# Patient Record
Sex: FEMALE | Race: WHITE | Employment: FULL TIME
[De-identification: names, ages, dates, MRNs, and addresses within clinical notes are randomized per-mention and may not be internally consistent; named-entity substitution may affect disease eponyms.]

---

## 2018-06-02 ENCOUNTER — HEALTH MAINTENANCE LETTER (OUTPATIENT)
Age: 48
End: 2018-06-02

## 2018-11-05 ENCOUNTER — TELEPHONE (OUTPATIENT)
Dept: FAMILY MEDICINE | Facility: CLINIC | Age: 48
End: 2018-11-05

## 2019-02-11 ENCOUNTER — TRANSFERRED RECORDS (OUTPATIENT)
Dept: HEALTH INFORMATION MANAGEMENT | Facility: CLINIC | Age: 49
End: 2019-02-11

## 2019-02-12 ENCOUNTER — DOCUMENTATION ONLY (OUTPATIENT)
Dept: CARE COORDINATION | Facility: CLINIC | Age: 49
End: 2019-02-12

## 2019-02-17 DIAGNOSIS — C78.7 METASTATIC RENAL CELL CARCINOMA TO LIVER (H): Primary | ICD-10-CM

## 2019-02-17 DIAGNOSIS — C64.9 METASTATIC RENAL CELL CARCINOMA TO LIVER (H): Primary | ICD-10-CM

## 2019-02-19 ENCOUNTER — TELEPHONE (OUTPATIENT)
Dept: VASCULAR SURGERY | Facility: CLINIC | Age: 49
End: 2019-02-19

## 2019-02-19 DIAGNOSIS — C64.9 METASTATIC RENAL CELL CARCINOMA TO LIVER (H): Primary | ICD-10-CM

## 2019-02-19 DIAGNOSIS — C78.7 METASTATIC RENAL CELL CARCINOMA TO LIVER (H): Primary | ICD-10-CM

## 2019-02-19 NOTE — TELEPHONE ENCOUNTER
"Previsit call to patient regarding new pt consult.     Inquired on a new appt time. Apologized that there was a cross in communication.     We will go ahead and r/s her to Mon 2/25 with Dr Winter at 120pm.     She agrees to this. appt details given but will also send a letter and map.     She states that her chemo schedule is that she has 2 weeks on and then 1 week off.     She will start her 2 week \"on\" chemo on 3/4 and 3/11.     Informed her that I will pass this information along.    Pt has my direct line should she have any other questions.     Winnie Limon RN, BSN  Interventional Radiology Nurse Coordinator   Phone: [phone]    "

## 2019-02-25 ENCOUNTER — OFFICE VISIT (OUTPATIENT)
Dept: VASCULAR SURGERY | Facility: CLINIC | Age: 49
End: 2019-02-25
Payer: COMMERCIAL

## 2019-02-25 VITALS — OXYGEN SATURATION: 96 % | HEART RATE: 85 BPM | DIASTOLIC BLOOD PRESSURE: 97 MMHG | SYSTOLIC BLOOD PRESSURE: 155 MMHG

## 2019-02-25 DIAGNOSIS — C64.1 RENAL CELL CARCINOMA, RIGHT (H): Primary | ICD-10-CM

## 2019-02-25 DIAGNOSIS — C64.9 METASTATIC RENAL CELL CARCINOMA TO LIVER (H): Primary | ICD-10-CM

## 2019-02-25 DIAGNOSIS — C78.7 METASTATIC RENAL CELL CARCINOMA TO LIVER (H): Primary | ICD-10-CM

## 2019-02-25 RX ORDER — SUNITINIB MALATE 37.5 MG/1
CAPSULE ORAL
COMMUNITY
Start: 2018-11-30

## 2019-02-25 RX ORDER — RIVAROXABAN 20 MG/1
TABLET, FILM COATED ORAL
COMMUNITY
Start: 2019-02-21

## 2019-02-25 ASSESSMENT — PAIN SCALES - GENERAL: PAINLEVEL: MODERATE PAIN (5)

## 2019-02-25 NOTE — PATIENT INSTRUCTIONS
Preventive Care:    Colorectal Cancer Screening: During our visit today, we discussed that it is recommended you receive colorectal cancer screening. Please call or make an appointment with your primary care provider to discuss this. You may also call the  [employer] scheduling line ([phone]) to set up a colonoscopy appointment.      We have made the Ultrasound appointment for Thursday 2/28/2019  Check in: 645am for a 7am appointment  -Please do not have anything to eat or drink 8 hours prior to this procedure.     Should you need to reschedule, please call the scheduling line at [phone] to reschedule prior to your treatment date as indicated below.           APPOINTMENT DETAILS    DATE: Wednesday 3/6/2019  CHECK IN: 1030AM      Please check into the Pre-op Area, 3rd floor , at [facility] located at [address] [place] [address].    Reminders:    -Nothing to eat or drink after midnight.    -Please have a  as you will be going home afterwards.    -Please do take your morning medications as indicated with just enough water to swallow, with the exception of the medications listed below:     -PLEASE DO NOT TAKE YOUR ASPIRIN 5 DAYS PRIOR TO THIS PROCEDURE AS DISCUSSED.      -WHAT TO DO INCASE OF THE FOLLOWING SYMPTOMS:       Please call me during business hours (M-F 8-4PM)  should you develop the following symptoms:     1. Fever over 102, that does not come down with over the counter medications (EX. Tylenol, ibuprofen)  2. Swelling in the lower legs.  3. Vomiting, Nausea  4. Uncontrollable pain, with the use of pain medications  5. Back pain that radiates to the lower back and pelvis region and doesn't go away with pain meds.   6. Or may have any other questions that I can assist you with    However, if it is after-hours or on the weekend,  please call the hospital's main number: [phone] and ask for the Interventional Radiologist On-call.     *After the procedure I  will call and follow up with you 2-3 days afterwards and our  will call you with a 1 month follow up appointment with imaging and lab work.    Should you have any further questions, please call us at anytime. It has been a pleasure to see you today.      Thank you,     Winnie Limon RN, BSN  Interventional Radiology Nurse Coordinator   Phone: [phone]

## 2019-02-25 NOTE — NURSING NOTE
Vascular Rooming Note     Maia Lazaro's goals for this visit include:   Chief Complaint   Patient presents with     Consult     Maia is being seen today for a consultatiopn for Liver Lesion, RCC referreed by Shayan Monte, as reported by christina.     Abby Espinoza LPN

## 2019-02-25 NOTE — PROGRESS NOTES
Interventional Radiology Clinic Visit    Date of this visit: 2/25/2019    Maia Lazaro  is referred by  for treatment recommendations. The patient requires evaluation for diagnosis of metastatic RCC.     Primary Physician: Asya Natarajan        History Of Present Illness:    Maia Lazaro is a 48 year old female who presents with diagnosis of metastatic RCC with sarcomatoid differentiation.  The patient had a 13 cm tumor resected by radical nephrectomy in October of 2018 with invasion of the perinephric fat and collecting system.  The tumor is T3a N1 M1.  The patient was started on Gemcitabine/sunitinib in December of 2018. She has overall responded well, with the majority of her disease showing improvement after 3 cycles.  However, she did develop a new solitary liver metastases, which represents a break through colony.  She presents today to discuss treatment options.  She states that her chemotherapy treatments are going fairly well, and she is still performing her jobs and activities of daily living.  She does notice reduced energy, for instances she is unable to work the 12 hour days she is use to.      She denies any history of liver disease or confusion related to liver disease.     Review of Systems:    General: Negative for recent fever.  Skin: Negative for jaundice.  Eyes: Negative for jaundice.  Respiratory: Negative for shortness of breath.  Cardiovascular: Negative for chest pain.  Gastrointestinal: Negative for abdominal pain or swelling, nausea, vomiting, or diarrhea.  Musculoskeletal: Negative for ankle swelling.    Past Medical/Surgical History:    Past Medical History:   Diagnosis Date     Hypertension goal BP (blood pressure) < 140/90      Past Surgical History:   Procedure Laterality Date     APPENDECTOMY      mid 20s     CHOLECYSTECTOMY  7/2014       Current Medications:    Current Outpatient Medications   Medication Sig Dispense Refill     amLODIPine (NORVASC) 5  MG tablet Take 1 tablet (5 mg) by mouth daily 90 tablet 1     Cholecalciferol (VITAMIN D PO) Take 1 tablet by mouth daily       multivitamin CF FORMULA (CHOICEFUL) softgel Take 1 tablet by mouth daily       SUNItinib Malate (SUTENT) 37.5 MG capsule CHEMO Take 37.5 mg by mouth daily for 14 days (Started on 11/30/2018). Followed by 7 day break.       XARELTO 20 MG TABS tablet        aspirin 81 MG EC tablet Take 1 tablet (81 mg) by mouth daily 90 tablet 3       Allergies:    Amoxicillin; Codeine; Erythromycin; and Oxycontin [oxycodone]    Family History:    No family history of RCC.     Social History:    Social History     Socioeconomic History     Marital status: Single     Spouse name: None     Number of children: None     Years of education: None     Highest education level: None   Occupational History     None   Social Needs     Financial resource strain: None     Food insecurity:     Worry: None     Inability: None     Transportation needs:     Medical: None     Non-medical: None   Tobacco Use     Smoking status: Never Smoker     Smokeless tobacco: Never Used   Substance and Sexual Activity     Alcohol use: Yes     Alcohol/week: 0.5 oz     Types: 1 drink(s) per week     Drug use: No     Sexual activity: Not Currently   Lifestyle     Physical activity:     Days per week: None     Minutes per session: None     Stress: None   Relationships     Social connections:     Talks on phone: None     Gets together: None     Attends Orthodox service: None     Active member of club or organization: None     Attends meetings of clubs or organizations: None     Relationship status: None     Intimate partner violence:     Fear of current or ex partner: None     Emotionally abused: None     Physically abused: None     Forced sexual activity: None   Other Topics Concern     Parent/sibling w/ CABG, MI or angioplasty before 65F 55M? No   Social History Narrative     None       Physical Exam:    BP (!) 155/97 (BP Location: Right arm,  Patient Position: Chair, Cuff Size: Adult Regular)   Pulse 85   SpO2 96%      GENERAL APPEARANCE: healthy, alert and no distress  PSYCHIATRIC: mentation appears normal and affect normal.  EYES: No jaundice.  SKIN: No jaundice.   RESP: lungs clear to auscultation - no rales, rhonchi or wheezes  CARDIOVASCULAR: regular rates and rhythm, normal S1 S2, no S3 or S4 and no murmur  ABDOMEN:  soft, nontender, no masses and bowel sounds normal.  MUSCULOSKELETAL: No edema in the lower extremities.    Laboratory Studies:    Lab Results   Component Value Date    HGB 12.9 03/31/2015     No results found for: PLT  No results found for: WBC    No results found for: INR    No results found for: PROTTOTAL   No results found for: ALBUMIN  No results found for: BILITOTAL  No results found for: BILICONJ   No results found for: ALKPHOS  No results found for: AST  No results found for: ALT    Lab Results   Component Value Date    CR 0.58 07/18/2016     Lab Results   Component Value Date    BUN 15 07/18/2016       No results found for: FETO    Imaging:     I reviewed the CT from 1/28/2019 which demonstrated a 1.39 cm metastatic lesion in the dome of the liver.       ASSESSMENT:      Maia Lazaro is a 48 year old female with metastatic RCC to the dome of the liver.     ECOG performance status: 0-1    I believe the patient is a candidate for a microwave ablation procedure.     I showed Ms. Lazaro the imaging and discussed the findings. I discussed with her that the goal of the procedure is local cure of the lesion with a survival benefit rather than a cure of the overall disease. Alternatives were reviewed, including surgery. I explained that ablation is performed under general anesthesia. We discussed what will happen before, during and after the procedure; what to expect in the post procedure recovery period; and what the follow-up will be. We discussed the risks of the procedure which include, but are not limited to infection,  bleeding, thermal injury to adjacent structures, pneumothorax that may require a chest tube, and incomplete treatment. We also discussed post-ablation syndrome which may occur and consists of varying degrees of pain and fever in the first few days post procedure. The patient also understands that new tumors may develop elsewhere given the nature of the disease. Most patients go home the same day, but occasionally circumstances are such that a longer admission may be required. I also informed the patient that I will be assisted during the procedure by a fellow and/or resident, and that sometimes a medical student may observe. All of the Ms. Lazaro's questions were answered and the patient agreed to proceed.     PLAN:    We will schedule her for the ablation procedure.    A total of 45 minutes was spent in care for the patient, of which >50% was spent in counseling and co-ordination of care.    It was a pleasure seeing the patient.     Tin Guzman M.D.  Department of Interventional Radiology  [facility]      CC  Patient Care Team:  Tasha Natarajan MD as PCP - General (Family Practice)  Tasha Natarajan MD as PCP - Assigned PCP  TASHA NATARAJAN

## 2019-02-25 NOTE — LETTER
2/25/2019       RE: Maia Lazaro  [address] [place] [address]  [facility] [address]     Dear Colleague,    Thank you for referring your patient, Maia Lazaro, to the [facility] at [facility]. Please see a copy of my visit note below.          Interventional Radiology Clinic Visit    Date of this visit: 2/25/2019    Maia Lazaro  is referred by  for treatment recommendations. The patient requires evaluation for diagnosis of metastatic RCC.     Primary Physician: Asya Natarajan       History Of Present Illness:    Maia Lazaro is a 48 year old female who presents with diagnosis of metastatic RCC with sarcomatoid differentiation.  The patient had a 13 cm tumor resected by radical nephrectomy in October of 2018 with invasion of the perinephric fat and collecting system.  The tumor is T3a N1 M1.  The patient was started on Gemcitabine/sunitinib in December of 2018. She has overall responded well, with the majority of her disease showing improvement after 3 cycles.  However, she did develop a new solitary liver metastases, which represents a break through colony.  She presents today to discuss treatment options.  She states that her chemotherapy treatments are going fairly well, and she is still performing her jobs and activities of daily living.  She does notice reduced energy, for instances she is unable to work the 12 hour days she is use to.      She denies any history of liver disease or confusion related to liver disease.     Review of Systems:    General: Negative for recent fever.  Skin: Negative for jaundice.  Eyes: Negative for jaundice.  Respiratory: Negative for shortness of breath.  Cardiovascular: Negative for chest pain.  Gastrointestinal: Negative for abdominal pain or swelling, nausea, vomiting, or diarrhea.  Musculoskeletal: Negative for ankle swelling.    Past Medical/Surgical History:    Past Medical History:   Diagnosis  Date     Hypertension goal BP (blood pressure) < 140/90      Past Surgical History:   Procedure Laterality Date     APPENDECTOMY      mid 20s     CHOLECYSTECTOMY  7/2014       Current Medications:    Current Outpatient Medications   Medication Sig Dispense Refill     amLODIPine (NORVASC) 5 MG tablet Take 1 tablet (5 mg) by mouth daily 90 tablet 1     Cholecalciferol (VITAMIN D PO) Take 1 tablet by mouth daily       multivitamin CF FORMULA (CHOICEFUL) softgel Take 1 tablet by mouth daily       SUNItinib Malate (SUTENT) 37.5 MG capsule CHEMO Take 37.5 mg by mouth daily for 14 days (Started on 11/30/2018). Followed by 7 day break.       XARELTO 20 MG TABS tablet        aspirin 81 MG EC tablet Take 1 tablet (81 mg) by mouth daily 90 tablet 3       Allergies:    Amoxicillin; Codeine; Erythromycin; and Oxycontin [oxycodone]    Family History:    No family history of RCC.     Social History:    Social History     Socioeconomic History     Marital status: Single     Spouse name: None     Number of children: None     Years of education: None     Highest education level: None   Occupational History     None   Social Needs     Financial resource strain: None     Food insecurity:     Worry: None     Inability: None     Transportation needs:     Medical: None     Non-medical: None   Tobacco Use     Smoking status: Never Smoker     Smokeless tobacco: Never Used   Substance and Sexual Activity     Alcohol use: Yes     Alcohol/week: 0.5 oz     Types: 1 drink(s) per week     Drug use: No     Sexual activity: Not Currently   Lifestyle     Physical activity:     Days per week: None     Minutes per session: None     Stress: None   Relationships     Social connections:     Talks on phone: None     Gets together: None     Attends Latter day service: None     Active member of club or organization: None     Attends meetings of clubs or organizations: None     Relationship status: None     Intimate partner violence:     Fear of current or ex  partner: None     Emotionally abused: None     Physically abused: None     Forced sexual activity: None   Other Topics Concern     Parent/sibling w/ CABG, MI or angioplasty before 65F 55M? No   Social History Narrative     None       Physical Exam:    BP (!) 155/97 (BP Location: Right arm, Patient Position: Chair, Cuff Size: Adult Regular)   Pulse 85   SpO2 96%      GENERAL APPEARANCE: healthy, alert and no distress  PSYCHIATRIC: mentation appears normal and affect normal.  EYES: No jaundice.  SKIN: No jaundice.   RESP: lungs clear to auscultation - no rales, rhonchi or wheezes  CARDIOVASCULAR: regular rates and rhythm, normal S1 S2, no S3 or S4 and no murmur  ABDOMEN:  soft, nontender, no masses and bowel sounds normal.  MUSCULOSKELETAL: No edema in the lower extremities.    Laboratory Studies:    Lab Results   Component Value Date    HGB 12.9 03/31/2015     No results found for: PLT  No results found for: WBC    No results found for: INR    No results found for: PROTTOTAL   No results found for: ALBUMIN  No results found for: BILITOTAL  No results found for: BILICONJ   No results found for: ALKPHOS  No results found for: AST  No results found for: ALT    Lab Results   Component Value Date    CR 0.58 07/18/2016     Lab Results   Component Value Date    BUN 15 07/18/2016       No results found for: FETO    Imaging:     I reviewed the CT from 1/28/2019 which demonstrated a 1.39 cm metastatic lesion in the dome of the liver.     ASSESSMENT:      Maia Lazaro is a 48 year old female with metastatic RCC to the dome of the liver.     ECOG performance status: 0-1    I believe the patient is a candidate for a microwave ablation procedure.     I showed Ms. Lazaro the imaging and discussed the findings. I discussed with her that the goal of the procedure is local cure of the lesion with a survival benefit rather than a cure of the overall disease. Alternatives were reviewed, including surgery. I explained that  ablation is performed under general anesthesia. We discussed what will happen before, during and after the procedure; what to expect in the post procedure recovery period; and what the follow-up will be. We discussed the risks of the procedure which include, but are not limited to infection, bleeding, thermal injury to adjacent structures, pneumothorax that may require a chest tube, and incomplete treatment. We also discussed post-ablation syndrome which may occur and consists of varying degrees of pain and fever in the first few days post procedure. The patient also understands that new tumors may develop elsewhere given the nature of the disease. Most patients go home the same day, but occasionally circumstances are such that a longer admission may be required. I also informed the patient that I will be assisted during the procedure by a fellow and/or resident, and that sometimes a medical student may observe. All of the Ms. Lazaro's questions were answered and the patient agreed to proceed.     PLAN:    We will schedule her for the ablation procedure.    A total of 45 minutes was spent in care for the patient, of which >50% was spent in counseling and co-ordination of care.    It was a pleasure seeing the patient.     SignedTin M.D.  Department of Interventional Radiology  [facility]    CC  Patient Care Team:  Tasha Natarajan MD as PCP - General (Family Practice)  Tasha Natarajan MD as PCP - Assigned PCP  TASHA NATARAJAN

## 2019-02-28 ENCOUNTER — TELEPHONE (OUTPATIENT)
Dept: VASCULAR SURGERY | Facility: CLINIC | Age: 49
End: 2019-02-28

## 2019-03-04 ENCOUNTER — ANCILLARY PROCEDURE (OUTPATIENT)
Dept: ULTRASOUND IMAGING | Facility: CLINIC | Age: 49
End: 2019-03-04
Attending: RADIOLOGY
Payer: COMMERCIAL

## 2019-03-04 DIAGNOSIS — C78.7 METASTATIC RENAL CELL CARCINOMA TO LIVER (H): ICD-10-CM

## 2019-03-04 DIAGNOSIS — C64.9 METASTATIC RENAL CELL CARCINOMA TO LIVER (H): ICD-10-CM

## 2019-03-04 SDOH — HEALTH STABILITY: MENTAL HEALTH: HOW OFTEN DO YOU HAVE A DRINK CONTAINING ALCOHOL?: NEVER

## 2019-03-04 NOTE — TELEPHONE ENCOUNTER
Called pt and left a msg. She has an upcoming liver ablation with Dr. Winter on Weds 3/6.     Informed her that she is to hold her Xarelto 2 days prior to her procedure. So last dose would be Sun 3/3.     This is per anesthesia guidelines.     Left direct line for her to return my call with any other questions.     Winnie Limon RN, BSN  Interventional Radiology Nurse Coordinator   Phone: [phone]

## 2019-03-06 ENCOUNTER — APPOINTMENT (OUTPATIENT)
Dept: INTERVENTIONAL RADIOLOGY/VASCULAR | Facility: CLINIC | Age: 49
End: 2019-03-06
Attending: RADIOLOGY
Payer: COMMERCIAL

## 2019-03-06 ENCOUNTER — TELEPHONE (OUTPATIENT)
Dept: VASCULAR SURGERY | Facility: CLINIC | Age: 49
End: 2019-03-06

## 2019-03-06 ENCOUNTER — HOSPITAL ENCOUNTER (OUTPATIENT)
Facility: CLINIC | Age: 49
Discharge: HOME | End: 2019-03-06
Attending: ANESTHESIOLOGY | Admitting: ANESTHESIOLOGY
Payer: COMMERCIAL

## 2019-03-06 ENCOUNTER — ANESTHESIA EVENT (OUTPATIENT)
Dept: SURGERY | Facility: CLINIC | Age: 49
End: 2019-03-06
Payer: COMMERCIAL

## 2019-03-06 ENCOUNTER — ANESTHESIA (OUTPATIENT)
Dept: SURGERY | Facility: CLINIC | Age: 49
End: 2019-03-06
Payer: COMMERCIAL

## 2019-03-06 VITALS
BODY MASS INDEX: 37.86 KG/M2 | HEIGHT: 64 IN | DIASTOLIC BLOOD PRESSURE: 90 MMHG | SYSTOLIC BLOOD PRESSURE: 142 MMHG | WEIGHT: 221.78 LBS | TEMPERATURE: 97.8 F | RESPIRATION RATE: 16 BRPM | HEART RATE: 90 BPM | OXYGEN SATURATION: 94 %

## 2019-03-06 DIAGNOSIS — C78.7 METASTATIC RENAL CELL CARCINOMA TO LIVER (H): ICD-10-CM

## 2019-03-06 DIAGNOSIS — C64.9 METASTATIC RENAL CELL CARCINOMA TO LIVER (H): ICD-10-CM

## 2019-03-06 LAB
ALBUMIN SERPL-MCNC: 3.4 G/DL (ref 3.4–5)
ALP SERPL-CCNC: 75 U/L (ref 40–150)
ALT SERPL W P-5'-P-CCNC: 33 U/L (ref 0–50)
ANION GAP SERPL CALCULATED.3IONS-SCNC: 8 MMOL/L (ref 3–14)
APTT PPP: 30 SEC (ref 22–37)
AST SERPL W P-5'-P-CCNC: 22 U/L (ref 0–45)
BILIRUB DIRECT SERPL-MCNC: 0.2 MG/DL (ref 0–0.2)
BILIRUB SERPL-MCNC: 0.7 MG/DL (ref 0.2–1.3)
BUN SERPL-MCNC: 14 MG/DL (ref 7–30)
CALCIUM SERPL-MCNC: 8.7 MG/DL (ref 8.5–10.1)
CHLORIDE SERPL-SCNC: 102 MMOL/L (ref 94–109)
CO2 SERPL-SCNC: 27 MMOL/L (ref 20–32)
CREAT SERPL-MCNC: 0.8 MG/DL (ref 0.52–1.04)
ERYTHROCYTE [DISTWIDTH] IN BLOOD BY AUTOMATED COUNT: 15.7 % (ref 10–15)
GFR SERPL CREATININE-BSD FRML MDRD: 85 ML/MIN/{1.73_M2}
GLUCOSE BLDC GLUCOMTR-MCNC: 82 MG/DL (ref 70–99)
GLUCOSE SERPL-MCNC: 89 MG/DL (ref 70–99)
HCG UR QL: NEGATIVE
HCT VFR BLD AUTO: 33.1 % (ref 35–47)
HGB BLD-MCNC: 10.5 G/DL (ref 11.7–15.7)
INR PPP: 1.03 (ref 0.86–1.14)
MCH RBC QN AUTO: 32.8 PG (ref 26.5–33)
MCHC RBC AUTO-ENTMCNC: 31.7 G/DL (ref 31.5–36.5)
MCV RBC AUTO: 103 FL (ref 78–100)
PLATELET # BLD AUTO: 418 10E9/L (ref 150–450)
POTASSIUM SERPL-SCNC: 4 MMOL/L (ref 3.4–5.3)
PROT SERPL-MCNC: 7.5 G/DL (ref 6.8–8.8)
RBC # BLD AUTO: 3.2 10E12/L (ref 3.8–5.2)
SODIUM SERPL-SCNC: 137 MMOL/L (ref 133–144)
WBC # BLD AUTO: 4 10E9/L (ref 4–11)

## 2019-03-06 PROCEDURE — 40000170 ZZH STATISTIC PRE-PROCEDURE ASSESSMENT II

## 2019-03-06 PROCEDURE — 71000014 ZZH RECOVERY PHASE 1 LEVEL 2 FIRST HR

## 2019-03-06 PROCEDURE — 27210732 ZZH ACCESSORY CR1

## 2019-03-06 PROCEDURE — 85027 COMPLETE CBC AUTOMATED: CPT | Performed by: RADIOLOGY

## 2019-03-06 PROCEDURE — 25000128 H RX IP 250 OP 636: Performed by: NURSE ANESTHETIST, CERTIFIED REGISTERED

## 2019-03-06 PROCEDURE — 71000027 ZZH RECOVERY PHASE 2 EACH 15 MINS

## 2019-03-06 PROCEDURE — 25000565 ZZH ISOFLURANE, EA 15 MIN

## 2019-03-06 PROCEDURE — 37000009 ZZH ANESTHESIA TECHNICAL FEE, EACH ADDTL 15 MIN

## 2019-03-06 PROCEDURE — 27211194 CT LIVER ABLATION (RF)

## 2019-03-06 PROCEDURE — 85730 THROMBOPLASTIN TIME PARTIAL: CPT | Performed by: RADIOLOGY

## 2019-03-06 PROCEDURE — 81025 URINE PREGNANCY TEST: CPT | Performed by: ANESTHESIOLOGY

## 2019-03-06 PROCEDURE — 82962 GLUCOSE BLOOD TEST: CPT

## 2019-03-06 PROCEDURE — 25800030 ZZH RX IP 258 OP 636: Performed by: NURSE ANESTHETIST, CERTIFIED REGISTERED

## 2019-03-06 PROCEDURE — 27210903 ZZH KIT CR5

## 2019-03-06 PROCEDURE — 25000128 H RX IP 250 OP 636: Performed by: ANESTHESIOLOGY

## 2019-03-06 PROCEDURE — 37000008 ZZH ANESTHESIA TECHNICAL FEE, 1ST 30 MIN

## 2019-03-06 PROCEDURE — 27210908 ZZH NEEDLE CR4

## 2019-03-06 PROCEDURE — 80076 HEPATIC FUNCTION PANEL: CPT | Performed by: RADIOLOGY

## 2019-03-06 PROCEDURE — 80048 BASIC METABOLIC PNL TOTAL CA: CPT | Performed by: RADIOLOGY

## 2019-03-06 PROCEDURE — 25000125 ZZHC RX 250: Performed by: NURSE ANESTHETIST, CERTIFIED REGISTERED

## 2019-03-06 PROCEDURE — 85610 PROTHROMBIN TIME: CPT | Performed by: RADIOLOGY

## 2019-03-06 RX ORDER — MEPERIDINE HYDROCHLORIDE 50 MG/ML
12.5 INJECTION INTRAMUSCULAR; INTRAVENOUS; SUBCUTANEOUS
Status: DISCONTINUED | OUTPATIENT
Start: 2019-03-06 | End: 2019-03-06 | Stop reason: HOSPADM

## 2019-03-06 RX ORDER — NICOTINE POLACRILEX 4 MG
15-30 LOZENGE BUCCAL
Status: DISCONTINUED | OUTPATIENT
Start: 2019-03-06 | End: 2019-03-06 | Stop reason: HOSPADM

## 2019-03-06 RX ORDER — LIDOCAINE 40 MG/G
CREAM TOPICAL
Status: DISCONTINUED | OUTPATIENT
Start: 2019-03-06 | End: 2019-03-06 | Stop reason: HOSPADM

## 2019-03-06 RX ORDER — NICOTINE POLACRILEX 4 MG
15-30 LOZENGE BUCCAL
Status: CANCELLED | OUTPATIENT
Start: 2019-03-06

## 2019-03-06 RX ORDER — ONDANSETRON 4 MG/1
4 TABLET, ORALLY DISINTEGRATING ORAL EVERY 30 MIN PRN
Status: DISCONTINUED | OUTPATIENT
Start: 2019-03-06 | End: 2019-03-06 | Stop reason: HOSPADM

## 2019-03-06 RX ORDER — DEXTROSE MONOHYDRATE 25 G/50ML
25-50 INJECTION, SOLUTION INTRAVENOUS
Status: CANCELLED | OUTPATIENT
Start: 2019-03-06

## 2019-03-06 RX ORDER — ONDANSETRON 2 MG/ML
4 INJECTION INTRAMUSCULAR; INTRAVENOUS EVERY 30 MIN PRN
Status: DISCONTINUED | OUTPATIENT
Start: 2019-03-06 | End: 2019-03-06 | Stop reason: HOSPADM

## 2019-03-06 RX ORDER — ONDANSETRON 2 MG/ML
INJECTION INTRAMUSCULAR; INTRAVENOUS PRN
Status: DISCONTINUED | OUTPATIENT
Start: 2019-03-06 | End: 2019-03-06

## 2019-03-06 RX ORDER — LIDOCAINE HYDROCHLORIDE 20 MG/ML
INJECTION, SOLUTION INFILTRATION; PERINEURAL PRN
Status: DISCONTINUED | OUTPATIENT
Start: 2019-03-06 | End: 2019-03-06

## 2019-03-06 RX ORDER — LABETALOL 20 MG/4 ML (5 MG/ML) INTRAVENOUS SYRINGE
10
Status: DISCONTINUED | OUTPATIENT
Start: 2019-03-06 | End: 2019-03-06 | Stop reason: HOSPADM

## 2019-03-06 RX ORDER — HYDROMORPHONE HYDROCHLORIDE 1 MG/ML
.3-.5 INJECTION, SOLUTION INTRAMUSCULAR; INTRAVENOUS; SUBCUTANEOUS EVERY 10 MIN PRN
Status: DISCONTINUED | OUTPATIENT
Start: 2019-03-06 | End: 2019-03-06 | Stop reason: HOSPADM

## 2019-03-06 RX ORDER — PROPOFOL 10 MG/ML
INJECTION, EMULSION INTRAVENOUS PRN
Status: DISCONTINUED | OUTPATIENT
Start: 2019-03-06 | End: 2019-03-06

## 2019-03-06 RX ORDER — CEFAZOLIN SODIUM 1 G/3ML
INJECTION, POWDER, FOR SOLUTION INTRAMUSCULAR; INTRAVENOUS PRN
Status: DISCONTINUED | OUTPATIENT
Start: 2019-03-06 | End: 2019-03-06

## 2019-03-06 RX ORDER — SODIUM CHLORIDE, SODIUM LACTATE, POTASSIUM CHLORIDE, CALCIUM CHLORIDE 600; 310; 30; 20 MG/100ML; MG/100ML; MG/100ML; MG/100ML
INJECTION, SOLUTION INTRAVENOUS CONTINUOUS
Status: DISCONTINUED | OUTPATIENT
Start: 2019-03-06 | End: 2019-03-06 | Stop reason: HOSPADM

## 2019-03-06 RX ORDER — DEXTROSE MONOHYDRATE 25 G/50ML
25-50 INJECTION, SOLUTION INTRAVENOUS
Status: DISCONTINUED | OUTPATIENT
Start: 2019-03-06 | End: 2019-03-06 | Stop reason: HOSPADM

## 2019-03-06 RX ORDER — SODIUM CHLORIDE 9 MG/ML
INJECTION, SOLUTION INTRAVENOUS CONTINUOUS
Status: CANCELLED | OUTPATIENT
Start: 2019-03-06

## 2019-03-06 RX ORDER — ALBUTEROL SULFATE 0.83 MG/ML
2.5 SOLUTION RESPIRATORY (INHALATION) EVERY 4 HOURS PRN
Status: DISCONTINUED | OUTPATIENT
Start: 2019-03-06 | End: 2019-03-06 | Stop reason: HOSPADM

## 2019-03-06 RX ORDER — FENTANYL CITRATE 50 UG/ML
25-50 INJECTION, SOLUTION INTRAMUSCULAR; INTRAVENOUS
Status: CANCELLED | OUTPATIENT
Start: 2019-03-06

## 2019-03-06 RX ORDER — NALOXONE HYDROCHLORIDE 0.4 MG/ML
.1-.4 INJECTION, SOLUTION INTRAMUSCULAR; INTRAVENOUS; SUBCUTANEOUS
Status: DISCONTINUED | OUTPATIENT
Start: 2019-03-06 | End: 2019-03-06 | Stop reason: HOSPADM

## 2019-03-06 RX ORDER — FENTANYL CITRATE 50 UG/ML
INJECTION, SOLUTION INTRAMUSCULAR; INTRAVENOUS PRN
Status: DISCONTINUED | OUTPATIENT
Start: 2019-03-06 | End: 2019-03-06

## 2019-03-06 RX ORDER — CLINDAMYCIN PHOSPHATE 900 MG/50ML
900 INJECTION, SOLUTION INTRAVENOUS
Status: DISCONTINUED | OUTPATIENT
Start: 2019-03-06 | End: 2019-03-06 | Stop reason: HOSPADM

## 2019-03-06 RX ORDER — FENTANYL CITRATE 50 UG/ML
25-50 INJECTION, SOLUTION INTRAMUSCULAR; INTRAVENOUS
Status: DISCONTINUED | OUTPATIENT
Start: 2019-03-06 | End: 2019-03-06 | Stop reason: HOSPADM

## 2019-03-06 RX ORDER — ONDANSETRON 2 MG/ML
4 INJECTION INTRAMUSCULAR; INTRAVENOUS ONCE
Status: DISCONTINUED | OUTPATIENT
Start: 2019-03-06 | End: 2019-03-06 | Stop reason: HOSPADM

## 2019-03-06 RX ORDER — SODIUM CHLORIDE 9 MG/ML
INJECTION, SOLUTION INTRAVENOUS CONTINUOUS
Status: DISCONTINUED | OUTPATIENT
Start: 2019-03-06 | End: 2019-03-06 | Stop reason: HOSPADM

## 2019-03-06 RX ORDER — HEPARIN SODIUM (PORCINE) LOCK FLUSH IV SOLN 100 UNIT/ML 100 UNIT/ML
5 SOLUTION INTRAVENOUS
Status: DISCONTINUED | OUTPATIENT
Start: 2019-03-06 | End: 2019-03-06 | Stop reason: HOSPADM

## 2019-03-06 RX ORDER — SODIUM CHLORIDE, SODIUM LACTATE, POTASSIUM CHLORIDE, CALCIUM CHLORIDE 600; 310; 30; 20 MG/100ML; MG/100ML; MG/100ML; MG/100ML
INJECTION, SOLUTION INTRAVENOUS CONTINUOUS PRN
Status: DISCONTINUED | OUTPATIENT
Start: 2019-03-06 | End: 2019-03-06

## 2019-03-06 RX ADMIN — LIDOCAINE HYDROCHLORIDE 100 MG: 20 INJECTION, SOLUTION INFILTRATION; PERINEURAL at 13:26

## 2019-03-06 RX ADMIN — SUGAMMADEX 200 MG: 100 INJECTION, SOLUTION INTRAVENOUS at 16:03

## 2019-03-06 RX ADMIN — ONDANSETRON 4 MG: 2 INJECTION INTRAMUSCULAR; INTRAVENOUS at 17:02

## 2019-03-06 RX ADMIN — PROPOFOL 180 MG: 10 INJECTION, EMULSION INTRAVENOUS at 13:26

## 2019-03-06 RX ADMIN — Medication 0.5 MG: at 16:33

## 2019-03-06 RX ADMIN — ROCURONIUM BROMIDE 50 MG: 10 INJECTION INTRAVENOUS at 13:27

## 2019-03-06 RX ADMIN — ROCURONIUM BROMIDE 10 MG: 10 INJECTION INTRAVENOUS at 13:48

## 2019-03-06 RX ADMIN — CEFAZOLIN 2 G: 1 INJECTION, POWDER, FOR SOLUTION INTRAMUSCULAR; INTRAVENOUS at 13:35

## 2019-03-06 RX ADMIN — CEFAZOLIN 1 G: 1 INJECTION, POWDER, FOR SOLUTION INTRAMUSCULAR; INTRAVENOUS at 15:35

## 2019-03-06 RX ADMIN — SODIUM CHLORIDE, POTASSIUM CHLORIDE, SODIUM LACTATE AND CALCIUM CHLORIDE: 600; 310; 30; 20 INJECTION, SOLUTION INTRAVENOUS at 13:10

## 2019-03-06 RX ADMIN — ONDANSETRON 4 MG: 2 INJECTION INTRAMUSCULAR; INTRAVENOUS at 16:01

## 2019-03-06 RX ADMIN — FENTANYL CITRATE 50 MCG: 50 INJECTION, SOLUTION INTRAMUSCULAR; INTRAVENOUS at 14:19

## 2019-03-06 RX ADMIN — ROCURONIUM BROMIDE 10 MG: 10 INJECTION INTRAVENOUS at 15:53

## 2019-03-06 RX ADMIN — PHENYLEPHRINE HYDROCHLORIDE 100 MCG: 10 INJECTION, SOLUTION INTRAMUSCULAR; INTRAVENOUS; SUBCUTANEOUS at 14:32

## 2019-03-06 RX ADMIN — FENTANYL CITRATE 50 MCG: 50 INJECTION, SOLUTION INTRAMUSCULAR; INTRAVENOUS at 15:34

## 2019-03-06 RX ADMIN — ROCURONIUM BROMIDE 20 MG: 10 INJECTION INTRAVENOUS at 14:16

## 2019-03-06 RX ADMIN — MIDAZOLAM 2 MG: 1 INJECTION INTRAMUSCULAR; INTRAVENOUS at 13:23

## 2019-03-06 RX ADMIN — HEPARIN SODIUM (PORCINE) LOCK FLUSH IV SOLN 100 UNIT/ML 5 ML: 100 SOLUTION at 18:21

## 2019-03-06 RX ADMIN — Medication 0.5 MG: at 16:48

## 2019-03-06 RX ADMIN — ROCURONIUM BROMIDE 10 MG: 10 INJECTION INTRAVENOUS at 15:09

## 2019-03-06 RX ADMIN — FENTANYL CITRATE 100 MCG: 50 INJECTION, SOLUTION INTRAMUSCULAR; INTRAVENOUS at 13:25

## 2019-03-06 ASSESSMENT — PAIN DESCRIPTION - DESCRIPTORS
DESCRIPTORS: SORE
DESCRIPTORS: SORE

## 2019-03-06 ASSESSMENT — MIFFLIN-ST. JEOR: SCORE: 1611

## 2019-03-06 NOTE — TELEPHONE ENCOUNTER
"Pt calling to inform me that she just took her temperature.   Informs me that she is running a low grade fever ~100.1.     She states that she normally gets this way after chemo and this is \"her norm\".    Informed her that I'll consult with Dr. Winter and then call her back.    *Consulted with Dr. Winter.   Have pt come in for the procedure and he will further discuss.    *Called pt back.   Informed her to come in and then he will further discuss.     Winnie Limon RN, BSN  Interventional Radiology Nurse Coordinator   Phone: [phone]    "

## 2019-03-06 NOTE — ANESTHESIA CARE TRANSFER NOTE
Patient: Maia Lazaro    Procedure(s):  Anesthesia Coverage CT Guided Liver Ablation @1230 In IR/CT Rm 2    Diagnosis: Metastatic Renal Cell Carcinoma To Liver  Diagnosis Additional Information: No value filed.    Anesthesia Type:   No value filed.     Note:  Airway :Face Mask          Vitals: (Last set prior to Anesthesia Care Transfer)    CRNA VITALS  3/6/2019 1548 - 3/6/2019 1619      3/6/2019             SpO2:  99 %    EKG:  NSR                Electronically Signed By: FELIX Mitchell CRNA  March 6, 2019  4:19 PM

## 2019-03-06 NOTE — PROGRESS NOTES
Patient Name: Maia Lazaro  Medical Record Number: [id number]  Today's Date: 3/6/2019    Procedure: Microwave Liver Ablation  Proceduralist: Dr. Winter    Sedation start time: 1330  Sedation Notes: Sedation per anesthesia     Procedure start time: 1350  Puncture time: 1400    Report given to: per anesthesia    Other Notes: Pt arrived to IR room CT-2 from . Consent reviewed. Pt denies any questions or concerns regarding procedure. Pt positioned supine and monitored per protocol. Pt tolerated procedure without any noted complications. Pt transferred back to  via anesthesia.

## 2019-03-06 NOTE — ANESTHESIA PREPROCEDURE EVALUATION
"Anesthesia Pre-Procedure Evaluation    Patient: Maia Lazaro   MRN:     [id number] Gender:   female   Age:    48 year old :      1970        Preoperative Diagnosis: Metastatic Renal Cell Carcinoma To Liver   Procedure(s):  Anesthesia Coverage CT Guided Liver Ablation @1230 In IR/CT Rm 2     Past Medical History:   Diagnosis Date     Hypertension goal BP (blood pressure) < 140/90       Past Surgical History:   Procedure Laterality Date     APPENDECTOMY      mid 20s     CHOLECYSTECTOMY  2014                   PHYSICAL EXAM:   Mental Status/Neuro: A/A/O   Airway: Facies: Feasible  Mallampati: II  Mouth/Opening: Full  TM distance: > 6 cm  Neck ROM: Full   Respiratory: Auscultation: CTAB     Resp. Rate: Normal     Resp. Effort: Normal      CV: Rhythm: Regular  Rate: Age appropriate  Heart: Normal Sounds   Comments:      Dental: Normal                  Lab Results   Component Value Date    HGB 12.9 2015     2016    POTASSIUM 3.3 (L) 2016    CHLORIDE 105 2016    CO2 28 2016    BUN 15 2016    CR 0.58 2016    GLC 85 2016    MELINDA 9.0 2016           Preop Vitals  BP Readings from Last 3 Encounters:   19 (!) 156/91   19 (!) 155/97   16 110/88    Pulse Readings from Last 3 Encounters:   19 99   19 85   16 71      Resp Readings from Last 3 Encounters:   19 18   16 16   03/31/15 20    SpO2 Readings from Last 3 Encounters:   19 100%   19 96%   16 98%      Temp Readings from Last 1 Encounters:   19 37.8  C (100  F) (Oral)    Ht Readings from Last 1 Encounters:   19 1.626 m (5' 4\")      Wt Readings from Last 1 Encounters:   19 100.6 kg (221 lb 12.5 oz)    Estimated body mass index is 38.07 kg/m  as calculated from the following:    Height as of this encounter: 1.626 m (5' 4\").    Weight as of this encounter: 100.6 kg (221 lb 12.5 oz).     LDA:  Port A Cath Single 19 Right " Chest wall (Active)   Access Date 03/06/19 3/6/2019 12:00 PM   Access Attempts 1 3/6/2019 12:00 PM   Gauge/Length 20 gauge;3/4 inch;Power noncoring 90 degree bend 3/6/2019 12:00 PM   Number of days: 6            Assessment:   ASA SCORE: 3    NPO Status: > 6 hours since completed [employer]   Documentation: H&P complete; Preop Testing complete   Proceeding: Proceed without further delay  Tobacco Use:  NO Active use of Tobacco/UNKNOWN Tobacco use status     Plan:   Anes. Type:  General   Pre-Induction: Acetaminophen PO   Induction:  IV (Standard)   Airway: Oral ETT   Access/Monitoring: PIV   Maintenance: Balanced   Emergence: Procedure Site   Logistics: Same Day Surgery     Postop Pain/Sedation Strategy:  Standard-Options: Opioids PRN     PONV Management:  Adult Risk Factors: Female, Non-Smoker, Postop Opioids  Prevention: Ondansetron; Dexamethasone     CONSENT: Direct conversation   Plan and risks discussed with: Patient          Comments for Plan/Consent:  ______________________________________________________________________  I discussed the risks and benefits of general anesthesia with the patient.  Questions were sought and answered.      Amaury Milton MD  Attending Anesthesiologist                           Amaury Milton MD

## 2019-03-06 NOTE — PROCEDURES
Interventional Radiology Brief Post Procedure Note    Procedure: CT LIVER ABLATION (RF)    Proceduralist: Elieser Milligan MD and Tin Winter MD    Assistant: None    Time Out: Prior to the start of the procedure and with procedural staff participation, I verbally confirmed the patient s identity using two indicators, relevant allergies, that the procedure was appropriate and matched the consent or emergent situation, and that the correct equipment/implants were available. Immediately prior to starting the procedure I conducted the Time Out with the procedural staff and re-confirmed the patient s name, procedure, and site/side. (The Joint Commission universal protocol was followed.)  Yes    Medications   Medication Event Details Admin User Admin Time       Sedation: General Endotracheal Anesthesia (GET) administered and documented by Anesthesia Care Provider    Findings: Complex Ct guided ablation of liver tumor with hydro dissection, 2 MWA probes with tandem ablation x2    Estimated Blood Loss: Minimal    Fluoroscopy Time:  minute(s)    SPECIMENS: None    Complications: 1. None     Condition: Stable    Plan:   -to PACU to awaken from GETA per anesthesia  -If pain controlled and back to baseline she can discharge in 3 hours.  If not back to baseline, she will be arranged for observation admission.       Comments: See dictated procedure note for full details.    Elieser Milligan MD

## 2019-03-06 NOTE — DISCHARGE INSTRUCTIONS
***MD ORDERS***    1. No heavy lifting for 2 days (no greater than 10 pounds)    2. Resume usual diet    3. Can shower tomorrow    4. Dressing can come off at time of next shower    5. Pain killers as needed    6. Interventional Radiology will call you and arrange your follow up    *** END OF MD INSTRUCTIONS ***  ----------------------------------------------------------------------------------------------------------    [facility]  Same-Day Surgery   Adult Discharge Orders & Instructions     For 24 hours after surgery    1. Get plenty of rest.  A responsible adult must stay with you for at least 24 hours after you leave the hospital.   2. Do not drive or use heavy equipment.  If you have weakness or tingling, don't drive or use heavy equipment until this feeling goes away.  3. Do not drink alcohol.  4. Avoid strenuous or risky activities.  Ask for help when climbing stairs.   5. You may feel lightheaded.  IF so, sit for a few minutes before standing.  Have someone help you get up.   6. If you have nausea (feel sick to your stomach): Drink only clear liquids such as apple juice, ginger ale, broth or 7-Up.  Rest may also help.  Be sure to drink enough fluids.  Move to a regular diet as you feel able.  7. You may have a slight fever. Call the doctor if your fever is over 100 F (37.7 C) (taken under the tongue) or lasts longer than 24 hours.  8. You may have a dry mouth, a sore throat, muscle aches or trouble sleeping.  These should go away after 24 hours.  9. Do not make important or legal decisions.   Call your doctor for any of the followin.  Signs of infection (fever, growing tenderness at the surgery site, a large amount of drainage or bleeding, severe pain, foul-smelling drainage, redness, swelling).    2. It has been over 8 to 10 hours since surgery and you are still not able to urinate (pass water).    3.  Headache for over 24 hours.    4.  Numbness, tingling or  weakness the day after surgery (if you had spinal anesthesia).  To contact an anesthesiologist, please call  [phone] and ask for anesthesiologist on call (answered 24 hours a day)      Emergency Department:    [facility]: [phone]       (TTY for hearing impaired: [phone])

## 2019-03-06 NOTE — ANESTHESIA POSTPROCEDURE EVALUATION
Anesthesia POST Procedure Evaluation    Patient: Maia Lazaro   MRN:     [id number] Gender:   female   Age:    48 year old :      1970        Preoperative Diagnosis: Metastatic Renal Cell Carcinoma To Liver   Procedure(s):  Anesthesia Coverage CT Guided Liver Ablation @1230 In IR/CT Rm 2   Postop Comments: No value filed.       Anesthesia Type:  General    Reportable Event: NO     PAIN: Uncomplicated   Sign Out status: Comfortable, Well controlled pain     PONV: No PONV   Sign Out status:  No Nausea or Vomiting     Neuro/Psych: Uneventful perioperative course   Sign Out Status: Preoperative baseline; Age appropriate mentation     Airway/Resp.: Uneventful perioperative course   Sign Out Status: Non labored breathing, age appropriate RR; Resp. Status within EXPECTED Parameters     CV: Uneventful perioperative course   Sign Out status: Appropriate BP and perfusion indices; Appropriate HR/Rhythm     Disposition:   Sign Out in:  PACU  Disposition:  Phase II; Home  Recovery Course: Uneventful  Follow-Up: Not required           Last Anesthesia Record Vitals:  CRNA VITALS  3/6/2019 1548 - 3/6/2019 1639      3/6/2019             SpO2:  99 %    EKG:  NSR          Last PACU/Preop Vitals:  Vitals:    19 1133 19 1620 19 1630   BP: (!) 156/91 (!) 152/101 (!) 161/105   Pulse: 99  78   Resp: 18 16 18   Temp: 37.8  C (100  F) 36.7  C (98  F)    SpO2: 100%  97%         Electronically Signed By: Rusty Beatty MD, 2019, 4:39 PM

## 2019-03-07 ENCOUNTER — TELEPHONE (OUTPATIENT)
Dept: VASCULAR SURGERY | Facility: CLINIC | Age: 49
End: 2019-03-07

## 2019-03-07 DIAGNOSIS — C64.9 METASTATIC RENAL CELL CARCINOMA TO LIVER (H): ICD-10-CM

## 2019-03-07 DIAGNOSIS — C78.7 METASTATIC RENAL CELL CARCINOMA TO LIVER (H): ICD-10-CM

## 2019-03-07 NOTE — TELEPHONE ENCOUNTER
"Called pt on her cell phone.     Called to f/u on her liver ablation.   She states that she feels \"o.k\"    She does have some abd pain on her right side, more so when she sits up.   Or when standing straight up and down.     Sharp pain this morning.   She is doing her inspirometer  She is also taking tramadol which helps    Denies vomiting but only had nausea yesterday however, none today    Appetite \"fair\"- at this time.     Asked about BM, n which she has not yet.   Advised about pain meds and constipation and using a stool softner or so. She verbalized understanding of this information.     We will go ahead and schedule for her follow up appt with Dr Winter.     She will call me with any other questions.     Winnie Limon RN, BSN  Interventional Radiology Nurse Coordinator   Phone: [phone]          "

## 2019-03-08 ENCOUNTER — TELEPHONE (OUTPATIENT)
Dept: VASCULAR SURGERY | Facility: CLINIC | Age: 49
End: 2019-03-08

## 2019-03-08 DIAGNOSIS — C64.9 METASTATIC RENAL CELL CARCINOMA, UNSPECIFIED LATERALITY (H): Primary | ICD-10-CM

## 2019-03-08 RX ORDER — TRAMADOL HYDROCHLORIDE 50 MG/1
50 TABLET ORAL EVERY 6 HOURS PRN
Qty: 40 TABLET | Refills: 0 | Status: SHIPPED | OUTPATIENT
Start: 2019-03-08

## 2019-03-08 NOTE — TELEPHONE ENCOUNTER
She is having abdominal pain, under the right rib, sharp pain. She rates it 9/10, after pain meds, pain is rated at 2/10.   The pain is intermittent and at the same time also had pain in her right shoulder.   I informed her that this could be 'referred' pain after the procedure as well.       She states that the pain is a little better today, however she was told not to use Tylenol due to the liver.        She only has 3 tablets left, as she had 10 to start off with. We are also going into the weekend and was wondering if she can have a little more.  She also hasn't had a BM today also. She is doing the miralax to help move her bowels also. We talked about how she can help stimulate her bowels. She is taking prune juice and walking.       *She was taking Tramadol 50mg take q8h, however last night called and was able to take it q6h instead.       *Consulted with Dr. Winter, regarding pain meds  due to the procedure, we will give her another script for Tramadol 50mg, qty: 40 tablets, to take q6h. zero refills.   This will be dropped off at  pharmacy d/c on 3rd floor [facility].       She verbalized understanding.       She will call me on Monday 3/11 to let me know how she does over the weekend.     Winnie Limon, RN, BSN  Interventional Radiology Nurse Coordinator   Phone: [phone]

## 2019-03-08 NOTE — TELEPHONE ENCOUNTER
"Called pt this morning as she had called and paged me yesterday evening.     When called her, she states that she had called last evening to inform me that she was having abdominal pain.   She did eventually call the IR doc on call yesterday evening.     Her question was that she was taking Tramadol q8h and se was wondering if she can take it sooner than that.   She was told by IR on call that she can take q6h. Since then, she's been doing much better and feeling better. No abdominal pain this morning.     She also said that she had a lot of \"gas\" pains as well, but this has gotten much better.     She continues to deny N/V, and fever.     I informed her that she can call me today if she has any other questions.     Winnie Limon RN, BSN  Interventional Radiology Nurse Coordinator   Phone: [phone]      "

## 2019-03-20 ENCOUNTER — TELEPHONE (OUTPATIENT)
Dept: VASCULAR SURGERY | Facility: CLINIC | Age: 49
End: 2019-03-20

## 2019-03-20 NOTE — TELEPHONE ENCOUNTER
Called pt f/u on her abd pain as she didn't call me.     She states that she's doing better. She actually stopped the pain meds a few days after she did receive the next refill.    She states that the first couple of days were tough, but she's doing better now.     I informed her that I'm glad to hear this.   We will continue to monitor and we will see her at her f/u appt.     Winnie Limon RN, BSN  Interventional Radiology Nurse Coordinator   Phone: [phone]

## 2019-03-30 ENCOUNTER — ANCILLARY PROCEDURE (OUTPATIENT)
Dept: MRI IMAGING | Facility: CLINIC | Age: 49
End: 2019-03-30
Attending: RADIOLOGY
Payer: COMMERCIAL

## 2019-03-30 DIAGNOSIS — C64.9 METASTATIC RENAL CELL CARCINOMA TO LIVER (H): ICD-10-CM

## 2019-03-30 DIAGNOSIS — C78.7 METASTATIC RENAL CELL CARCINOMA TO LIVER (H): ICD-10-CM

## 2019-03-30 LAB
ALBUMIN SERPL-MCNC: 3.3 G/DL (ref 3.4–5)
ALP SERPL-CCNC: 83 U/L (ref 40–150)
ALT SERPL W P-5'-P-CCNC: 38 U/L (ref 0–50)
ANION GAP SERPL CALCULATED.3IONS-SCNC: 6 MMOL/L (ref 3–14)
AST SERPL W P-5'-P-CCNC: 24 U/L (ref 0–45)
BILIRUB SERPL-MCNC: 0.5 MG/DL (ref 0.2–1.3)
BUN SERPL-MCNC: 12 MG/DL (ref 7–30)
CALCIUM SERPL-MCNC: 9 MG/DL (ref 8.5–10.1)
CHLORIDE SERPL-SCNC: 105 MMOL/L (ref 94–109)
CO2 SERPL-SCNC: 26 MMOL/L (ref 20–32)
CREAT SERPL-MCNC: 0.88 MG/DL (ref 0.52–1.04)
ERYTHROCYTE [DISTWIDTH] IN BLOOD BY AUTOMATED COUNT: 13.8 % (ref 10–15)
GFR SERPL CREATININE-BSD FRML MDRD: 76 ML/MIN/{1.73_M2}
GLUCOSE SERPL-MCNC: 96 MG/DL (ref 70–99)
HCT VFR BLD AUTO: 34.1 % (ref 35–47)
HGB BLD-MCNC: 10.5 G/DL (ref 11.7–15.7)
INR PPP: 1.07 (ref 0.86–1.14)
MCH RBC QN AUTO: 32 PG (ref 26.5–33)
MCHC RBC AUTO-ENTMCNC: 30.8 G/DL (ref 31.5–36.5)
MCV RBC AUTO: 104 FL (ref 78–100)
PLATELET # BLD AUTO: 376 10E9/L (ref 150–450)
POTASSIUM SERPL-SCNC: 3.9 MMOL/L (ref 3.4–5.3)
PROT SERPL-MCNC: 7.6 G/DL (ref 6.8–8.8)
RBC # BLD AUTO: 3.28 10E12/L (ref 3.8–5.2)
SODIUM SERPL-SCNC: 137 MMOL/L (ref 133–144)
WBC # BLD AUTO: 2.5 10E9/L (ref 4–11)

## 2019-03-30 RX ORDER — GADOBUTROL 604.72 MG/ML
10 INJECTION INTRAVENOUS ONCE
Status: COMPLETED | OUTPATIENT
Start: 2019-03-30 | End: 2019-03-30

## 2019-03-30 RX ADMIN — GADOBUTROL 10 ML: 604.72 INJECTION INTRAVENOUS at 09:12

## 2019-03-30 NOTE — DISCHARGE INSTRUCTIONS
MRI Contrast Discharge Instructions    The IV contrast you received today will pass out of your body in your  urine. This will happen in the next 24 hours. You will not feel this process.  Your urine will not change color.    Drink at least 4 extra glasses of water or juice today (unless your doctor  has restricted your fluids). This reduces the stress on your kidneys.  You may take your regular medicines.    If you are on dialysis: It is best to have dialysis today.    If you have a reaction: Most reactions happen right away. If you have  any new symptoms after leaving the hospital (such as hives or swelling),  call your hospital at the correct number below. Or call your family doctor.  If you have breathing distress or wheezing, call 911.    Special instructions: ***    I have read and understand the above information.    Signature:______________________________________ Date:___________    Staff:__________________________________________ Date:___________     Time:__________    [place] [facility] Departments:    ___[place]: [phone]  ___[facility]: [phone]  ___[place]: [phone] ___[facility]: [phone]  ___[facility]: [phone]  ___[facility]: [phone]  ___[phone]  ___[facility]: [phone]  ___[phone]

## 2019-04-01 ENCOUNTER — OFFICE VISIT (OUTPATIENT)
Dept: VASCULAR SURGERY | Facility: CLINIC | Age: 49
End: 2019-04-01
Payer: COMMERCIAL

## 2019-04-01 VITALS — DIASTOLIC BLOOD PRESSURE: 93 MMHG | SYSTOLIC BLOOD PRESSURE: 151 MMHG | OXYGEN SATURATION: 99 % | HEART RATE: 93 BPM

## 2019-04-01 DIAGNOSIS — C78.7 METASTATIC RENAL CELL CARCINOMA TO LIVER (H): Primary | ICD-10-CM

## 2019-04-01 DIAGNOSIS — C64.9 METASTATIC RENAL CELL CARCINOMA TO LIVER (H): Primary | ICD-10-CM

## 2019-04-01 ASSESSMENT — PAIN SCALES - GENERAL: PAINLEVEL: NO PAIN (0)

## 2019-04-01 NOTE — NURSING NOTE
Vascular Rooming Note     Maia Lazaro's goals for this visit include:   Chief Complaint   Patient presents with     RUPERT Carvalho, is being seen today for a 1 month follow up liver ablation, feeling fine, had MRI this past Saturday, interested to see the results, otherwise no other concerns at this time as reported by patient.     Abby Espinoza LPN

## 2019-04-01 NOTE — PATIENT INSTRUCTIONS
Preventive Care:    Colorectal Cancer Screening: During our visit today, we discussed that it is recommended you receive colorectal cancer screening. Please call or make an appointment with your primary care provider to discuss this. You may also call the [facility] scheduling line ([phone]) to set up a colonoscopy appointment.

## 2019-04-01 NOTE — LETTER
4/1/2019       RE: Maia Lazaro  [address]  [facility] [address]     Dear Colleague,    Thank you for referring your patient, Maia Lazaro, to the [facility] at [facility]. Please see a copy of my visit note below.    S: Ms. Lazaro returns following an MWA on 3/6.  As I'm sure you remember she had a 13 cm tumor resected by radical nephrectomy in October of 2018 with invasion of the perinephric fat and collecting system.  The tumor is T3a N1 M1.  The patient was started on Gemcitabine/sunitinib in December of 2018.  Her chemotherapy is working well in terms of controlling the majority of her disease, however, the liver lesion broke away and was growing quickly.    She reports only having mild pain after ablation which was controlled well with tramadol.  She did also have some nausea the day of the ablation, which resolved by the next day.   She is otherwise doing well and tolerating her infusions.    O:  BP (!) 151/93 (BP Location: Left arm, Patient Position: Chair, Cuff Size: Adult Large)   Pulse 93   SpO2 99%     Lab Results   Component Value Date    WBC 2.5 03/30/2019     Lab Results   Component Value Date    RBC 3.28 03/30/2019     Lab Results   Component Value Date    HGB 10.5 03/30/2019     Lab Results   Component Value Date    HCT 34.1 03/30/2019     Lab Results   Component Value Date     03/30/2019     Lab Results   Component Value Date    MCH 32.0 03/30/2019     Lab Results   Component Value Date    MCHC 30.8 03/30/2019     Lab Results   Component Value Date    RDW 13.8 03/30/2019     Lab Results   Component Value Date     03/30/2019     Last Comprehensive Metabolic Panel:  Sodium   Date Value Ref Range Status   03/30/2019 137 133 - 144 mmol/L Final     Potassium   Date Value Ref Range Status   03/30/2019 3.9 3.4 - 5.3 mmol/L Final     Chloride   Date Value Ref Range Status   03/30/2019 105 94 - 109 mmol/L Final      Carbon Dioxide   Date Value Ref Range Status   03/30/2019 26 20 - 32 mmol/L Final     Anion Gap   Date Value Ref Range Status   03/30/2019 6 3 - 14 mmol/L Final     Glucose   Date Value Ref Range Status   03/30/2019 96 70 - 99 mg/dL Final     Urea Nitrogen   Date Value Ref Range Status   03/30/2019 12 7 - 30 mg/dL Final     Creatinine   Date Value Ref Range Status   03/30/2019 0.88 0.52 - 1.04 mg/dL Final     GFR Estimate   Date Value Ref Range Status   03/30/2019 76 >60 mL/min/[1.73_m2] Final     Comment:     Non  GFR Calc  Starting 12/18/2018, serum creatinine based estimated GFR (eGFR) will be   calculated using the Chronic Kidney Disease Epidemiology Collaboration   (CKD-EPI) equation.       Calcium   Date Value Ref Range Status   03/30/2019 9.0 8.5 - 10.1 mg/dL Final     Bilirubin Total   Date Value Ref Range Status   03/30/2019 0.5 0.2 - 1.3 mg/dL Final     Alkaline Phosphatase   Date Value Ref Range Status   03/30/2019 83 40 - 150 U/L Final     ALT   Date Value Ref Range Status   03/30/2019 38 0 - 50 U/L Final     AST   Date Value Ref Range Status   03/30/2019 24 0 - 45 U/L Final     I reviewed the MRI from 3/30 which demonstrated no evidence of residual disease in the liver.  There is also a question of recurrent disease in the nephrectomy bed near where the right renal vein would be expected to join with the IVC.    A/P: Ms. Lazaro is doing very well s/p ablation on 3/6 for a fast growing liver metastases.  She does not appear to have any evidence of residual disease, however, she is still at high risk for recurrence.  We will plan to see her back in two months with a follow up MRI.      A total of 20 minutes was spent in care for the patient, of which >50% was spent in counseling and co-ordination of care.      Again, thank you for allowing me to participate in the care of your patient.      Sincerely,    Tin Winter MD

## 2019-04-02 DIAGNOSIS — C64.9 METASTATIC RENAL CELL CARCINOMA TO LIVER (H): Primary | ICD-10-CM

## 2019-04-02 DIAGNOSIS — C78.7 METASTATIC RENAL CELL CARCINOMA TO LIVER (H): Primary | ICD-10-CM

## 2019-04-03 NOTE — PROGRESS NOTES
S: Ms. Lazaro returns following an MWA on 3/6.  As I'm sure you remember she had a 13 cm tumor resected by radical nephrectomy in October of 2018 with invasion of the perinephric fat and collecting system.  The tumor is T3a N1 M1.  The patient was started on Gemcitabine/sunitinib in December of 2018.  Her chemotherapy is working well in terms of controlling the majority of her disease, however, the liver lesion broke away and was growing quickly.    She reports only having mild pain after ablation which was controlled well with tramadol.  She did also have some nausea the day of the ablation, which resolved by the next day.   She is otherwise doing well and tolerating her infusions.    O:  BP (!) 151/93 (BP Location: Left arm, Patient Position: Chair, Cuff Size: Adult Large)   Pulse 93   SpO2 99%     Lab Results   Component Value Date    WBC 2.5 03/30/2019     Lab Results   Component Value Date    RBC 3.28 03/30/2019     Lab Results   Component Value Date    HGB 10.5 03/30/2019     Lab Results   Component Value Date    HCT 34.1 03/30/2019     Lab Results   Component Value Date     03/30/2019     Lab Results   Component Value Date    MCH 32.0 03/30/2019     Lab Results   Component Value Date    MCHC 30.8 03/30/2019     Lab Results   Component Value Date    RDW 13.8 03/30/2019     Lab Results   Component Value Date     03/30/2019     Last Comprehensive Metabolic Panel:  Sodium   Date Value Ref Range Status   03/30/2019 137 133 - 144 mmol/L Final     Potassium   Date Value Ref Range Status   03/30/2019 3.9 3.4 - 5.3 mmol/L Final     Chloride   Date Value Ref Range Status   03/30/2019 105 94 - 109 mmol/L Final     Carbon Dioxide   Date Value Ref Range Status   03/30/2019 26 20 - 32 mmol/L Final     Anion Gap   Date Value Ref Range Status   03/30/2019 6 3 - 14 mmol/L Final     Glucose   Date Value Ref Range Status   03/30/2019 96 70 - 99 mg/dL Final     Urea Nitrogen   Date Value Ref Range Status    03/30/2019 12 7 - 30 mg/dL Final     Creatinine   Date Value Ref Range Status   03/30/2019 0.88 0.52 - 1.04 mg/dL Final     GFR Estimate   Date Value Ref Range Status   03/30/2019 76 >60 mL/min/[1.73_m2] Final     Comment:     Non  GFR Calc  Starting 12/18/2018, serum creatinine based estimated GFR (eGFR) will be   calculated using the Chronic Kidney Disease Epidemiology Collaboration   (CKD-EPI) equation.       Calcium   Date Value Ref Range Status   03/30/2019 9.0 8.5 - 10.1 mg/dL Final     Bilirubin Total   Date Value Ref Range Status   03/30/2019 0.5 0.2 - 1.3 mg/dL Final     Alkaline Phosphatase   Date Value Ref Range Status   03/30/2019 83 40 - 150 U/L Final     ALT   Date Value Ref Range Status   03/30/2019 38 0 - 50 U/L Final     AST   Date Value Ref Range Status   03/30/2019 24 0 - 45 U/L Final     I reviewed the MRI from 3/30 which demonstrated no evidence of residual disease in the liver.  There is also a question of recurrent disease in the nephrectomy bed near where the right renal vein would be expected to join with the IVC.    A/P: Ms. Lazaro is doing very well s/p ablation on 3/6 for a fast growing liver metastases.  She does not appear to have any evidence of residual disease, however, she is still at high risk for recurrence.  We will plan to see her back in two months with a follow up MRI.      A total of 20 minutes was spent in care for the patient, of which >50% was spent in counseling and co-ordination of care.

## 2019-04-16 ENCOUNTER — TRANSFERRED RECORDS (OUTPATIENT)
Dept: HEALTH INFORMATION MANAGEMENT | Facility: CLINIC | Age: 49
End: 2019-04-16

## (undated) RX ORDER — HYDROMORPHONE HYDROCHLORIDE 1 MG/ML
INJECTION, SOLUTION INTRAMUSCULAR; INTRAVENOUS; SUBCUTANEOUS
Status: DISPENSED
Start: 2019-03-06

## (undated) RX ORDER — ONDANSETRON 2 MG/ML
INJECTION INTRAMUSCULAR; INTRAVENOUS
Status: DISPENSED
Start: 2019-03-06

## (undated) RX ORDER — PROPOFOL 10 MG/ML
INJECTION, EMULSION INTRAVENOUS
Status: DISPENSED
Start: 2019-03-06

## (undated) RX ORDER — FENTANYL CITRATE 50 UG/ML
INJECTION, SOLUTION INTRAMUSCULAR; INTRAVENOUS
Status: DISPENSED
Start: 2019-03-06

## (undated) RX ORDER — LIDOCAINE HYDROCHLORIDE 20 MG/ML
INJECTION, SOLUTION EPIDURAL; INFILTRATION; INTRACAUDAL; PERINEURAL
Status: DISPENSED
Start: 2019-03-06

## (undated) RX ORDER — CEFAZOLIN SODIUM 1 G
VIAL (EA) INJECTION
Status: DISPENSED
Start: 2019-03-06

## (undated) RX ORDER — LIDOCAINE HYDROCHLORIDE 10 MG/ML
INJECTION, SOLUTION INFILTRATION; PERINEURAL
Status: DISPENSED
Start: 2019-03-06

## (undated) RX ORDER — LIDOCAINE 40 MG/G
CREAM TOPICAL
Status: DISPENSED
Start: 2019-03-06